# Patient Record
Sex: FEMALE | URBAN - METROPOLITAN AREA
[De-identification: names, ages, dates, MRNs, and addresses within clinical notes are randomized per-mention and may not be internally consistent; named-entity substitution may affect disease eponyms.]

---

## 2019-02-04 ENCOUNTER — APPOINTMENT (RX ONLY)
Dept: URBAN - METROPOLITAN AREA CLINIC 111 | Facility: CLINIC | Age: 49
Setting detail: DERMATOLOGY
End: 2019-02-04

## 2019-02-04 DIAGNOSIS — T78.3XX: ICD-10-CM

## 2019-02-04 PROBLEM — L85.3 XEROSIS CUTIS: Status: ACTIVE | Noted: 2019-02-04

## 2019-02-04 PROBLEM — J30.1 ALLERGIC RHINITIS DUE TO POLLEN: Status: ACTIVE | Noted: 2019-02-04

## 2019-02-04 PROBLEM — F41.9 ANXIETY DISORDER, UNSPECIFIED: Status: ACTIVE | Noted: 2019-02-04

## 2019-02-04 PROBLEM — T78.3XXA ANGIONEUROTIC EDEMA, INITIAL ENCOUNTER: Status: ACTIVE | Noted: 2019-02-04

## 2019-02-04 PROCEDURE — ? COUNSELING

## 2019-02-04 PROCEDURE — 99202 OFFICE O/P NEW SF 15 MIN: CPT

## 2019-02-04 PROCEDURE — ? PRESCRIPTION

## 2019-02-04 PROCEDURE — ? TREATMENT REGIMEN

## 2019-02-04 PROCEDURE — ? ORDER TESTS

## 2019-02-04 RX ORDER — MUPIROCIN 20 MG/G
OINTMENT TOPICAL
Qty: 1 | Refills: 1 | Status: ERX | COMMUNITY
Start: 2019-02-04

## 2019-02-04 RX ORDER — DESONIDE 0.5 MG/G
OINTMENT TOPICAL
Qty: 1 | Refills: 1 | Status: ERX | COMMUNITY
Start: 2019-02-04

## 2019-02-04 RX ADMIN — MUPIROCIN: 20 OINTMENT TOPICAL at 22:31

## 2019-02-04 RX ADMIN — DESONIDE: 0.5 OINTMENT TOPICAL at 22:32

## 2019-02-04 ASSESSMENT — LOCATION ZONE DERM: LOCATION ZONE: LIP

## 2019-02-04 ASSESSMENT — LOCATION DETAILED DESCRIPTION DERM
LOCATION DETAILED: RIGHT SUPERIOR VERMILION LIP
LOCATION DETAILED: RIGHT INFERIOR VERMILION LIP

## 2019-02-04 ASSESSMENT — LOCATION SIMPLE DESCRIPTION DERM: LOCATION SIMPLE: RIGHT LIP

## 2019-02-04 NOTE — HPI: RASH
What Type Of Note Output Would You Prefer (Optional)?: Bullet Format
How Severe Is Your Rash?: moderate
Is This A New Presentation, Or A Follow-Up?: Rash
Additional History: Patient has a history of cold sores and states she has never had an outbreak as intense as this. She believes it may be a drug reaction from lisinopril 20 mg. She has since d/c this medication. Patient additional reports she had IM steroid recently. Patient is currently treating with Cephalexin, Benadryl and mupirocin previously prescribed from urgent care.

## 2019-02-12 ENCOUNTER — APPOINTMENT (RX ONLY)
Dept: URBAN - METROPOLITAN AREA CLINIC 111 | Facility: CLINIC | Age: 49
Setting detail: DERMATOLOGY
End: 2019-02-12

## 2019-02-12 DIAGNOSIS — L23.89 ALLERGIC CONTACT DERMATITIS DUE TO OTHER AGENTS: ICD-10-CM

## 2019-02-12 DIAGNOSIS — T78.3XX: ICD-10-CM

## 2019-02-12 PROBLEM — T78.3XXA ANGIONEUROTIC EDEMA, INITIAL ENCOUNTER: Status: ACTIVE | Noted: 2019-02-12

## 2019-02-12 PROCEDURE — 96372 THER/PROPH/DIAG INJ SC/IM: CPT

## 2019-02-12 PROCEDURE — 99213 OFFICE O/P EST LOW 20 MIN: CPT | Mod: 25

## 2019-02-12 PROCEDURE — ? INTRAMUSCULAR KENALOG

## 2019-02-12 PROCEDURE — ? COUNSELING

## 2019-02-12 PROCEDURE — ? TREATMENT REGIMEN

## 2019-02-12 ASSESSMENT — LOCATION DETAILED DESCRIPTION DERM
LOCATION DETAILED: LEFT INFERIOR VERMILION LIP
LOCATION DETAILED: PHILTRUM
LOCATION DETAILED: RIGHT BUTTOCK
LOCATION DETAILED: RIGHT SUPERIOR VERMILION LIP
LOCATION DETAILED: LEFT LOWER CUTANEOUS LIP

## 2019-02-12 ASSESSMENT — LOCATION SIMPLE DESCRIPTION DERM
LOCATION SIMPLE: LEFT LIP
LOCATION SIMPLE: RIGHT BUTTOCK
LOCATION SIMPLE: UPPER LIP
LOCATION SIMPLE: RIGHT LIP

## 2019-02-12 ASSESSMENT — LOCATION ZONE DERM
LOCATION ZONE: LIP
LOCATION ZONE: TRUNK

## 2019-02-12 NOTE — PROCEDURE: COUNSELING
Detail Level: Simple
Patient Specific Counseling (Will Not Stick From Patient To Patient): Recommend to avoid cinnamon products until resolved. Wash face with gentle cleansers and gentle moisturizers. If no improvement follow up in 3 weeks, will assess if allergen testing is needed at that time.
Detail Level: Detailed

## 2019-02-12 NOTE — PROCEDURE: TREATMENT REGIMEN
Detail Level: Zone
Continue Regimen: Patient to begin to take Xyzal 1 tab,etc by mouth once daily (will not cause drowsiness), continue for at least one month.
Discontinue Regimen: Mupirocin ointment
Initiate Treatment: Plain Vaseline applied to lips daily.
